# Patient Record
Sex: MALE | Race: WHITE | NOT HISPANIC OR LATINO | Employment: PART TIME | URBAN - METROPOLITAN AREA
[De-identification: names, ages, dates, MRNs, and addresses within clinical notes are randomized per-mention and may not be internally consistent; named-entity substitution may affect disease eponyms.]

---

## 2017-02-16 ENCOUNTER — OFFICE VISIT - RIVER FALLS (OUTPATIENT)
Dept: FAMILY MEDICINE | Facility: CLINIC | Age: 32
End: 2017-02-16

## 2017-02-16 ENCOUNTER — COMMUNICATION - RIVER FALLS (OUTPATIENT)
Dept: FAMILY MEDICINE | Facility: CLINIC | Age: 32
End: 2017-02-16

## 2017-02-17 LAB
CHOLEST SERPL-MCNC: 174 MG/DL (ref 125–200)
CHOLEST/HDLC SERPL: 5.8 {RATIO}
CREAT SERPL-MCNC: 1.01 MG/DL (ref 0.6–1.35)
GLUCOSE BLD-MCNC: 98 MG/DL (ref 65–99)
HDLC SERPL-MCNC: 30 MG/DL
LDLC SERPL CALC-MCNC: 97 MG/DL
NONHDLC SERPL-MCNC: 144 MG/DL
TRIGL SERPL-MCNC: 234 MG/DL

## 2018-05-16 ENCOUNTER — OFFICE VISIT - RIVER FALLS (OUTPATIENT)
Dept: FAMILY MEDICINE | Facility: CLINIC | Age: 33
End: 2018-05-16

## 2018-05-16 ASSESSMENT — MIFFLIN-ST. JEOR: SCORE: 2333.33

## 2018-05-17 LAB
CHOLEST SERPL-MCNC: 164 MG/DL
CHOLEST/HDLC SERPL: 4.4 {RATIO}
CREAT SERPL-MCNC: 0.82 MG/DL (ref 0.6–1.35)
GLUCOSE BLD-MCNC: 98 MG/DL (ref 65–99)
HDLC SERPL-MCNC: 37 MG/DL
LDLC SERPL CALC-MCNC: 99 MG/DL
NONHDLC SERPL-MCNC: 127 MG/DL
TRIGL SERPL-MCNC: 188 MG/DL

## 2019-07-16 ENCOUNTER — OFFICE VISIT - RIVER FALLS (OUTPATIENT)
Dept: FAMILY MEDICINE | Facility: CLINIC | Age: 34
End: 2019-07-16

## 2019-07-16 ENCOUNTER — TRANSFERRED RECORDS (OUTPATIENT)
Dept: MULTI SPECIALTY CLINIC | Facility: CLINIC | Age: 34
End: 2019-07-16

## 2019-07-16 ASSESSMENT — MIFFLIN-ST. JEOR: SCORE: 2335.14

## 2019-07-17 ENCOUNTER — COMMUNICATION - RIVER FALLS (OUTPATIENT)
Dept: FAMILY MEDICINE | Facility: CLINIC | Age: 34
End: 2019-07-17

## 2019-07-17 LAB
BUN SERPL-MCNC: 10 MG/DL (ref 7–25)
BUN/CREAT RATIO - HISTORICAL: ABNORMAL (ref 6–22)
CALCIUM SERPL-MCNC: 9.6 MG/DL (ref 8.6–10.3)
CHLORIDE BLD-SCNC: 104 MMOL/L (ref 98–110)
CO2 SERPL-SCNC: 25 MMOL/L (ref 20–32)
CREAT SERPL-MCNC: 0.97 MG/DL (ref 0.6–1.35)
EGFRCR SERPLBLD CKD-EPI 2021: 102 ML/MIN/1.73M2
ERYTHROCYTE [DISTWIDTH] IN BLOOD BY AUTOMATED COUNT: 13.6 % (ref 11–15)
GLUCOSE BLD-MCNC: 130 MG/DL (ref 65–99)
HCT VFR BLD AUTO: 48.9 % (ref 38.5–50)
HGB BLD-MCNC: 17 GM/DL (ref 13.2–17.1)
MCH RBC QN AUTO: 28.2 PG (ref 27–33)
MCHC RBC AUTO-ENTMCNC: 34.8 GM/DL (ref 32–36)
MCV RBC AUTO: 81.1 FL (ref 80–100)
PLATELET # BLD AUTO: 248 10*3/UL (ref 140–400)
PMV BLD: 10.5 FL (ref 7.5–12.5)
POTASSIUM BLD-SCNC: 4.1 MMOL/L (ref 3.5–5.3)
RBC # BLD AUTO: 6.03 10*6/UL (ref 4.2–5.8)
SODIUM SERPL-SCNC: 137 MMOL/L (ref 135–146)
WBC # BLD AUTO: 5.4 10*3/UL (ref 3.8–10.8)

## 2020-08-12 ENCOUNTER — COMMUNICATION - RIVER FALLS (OUTPATIENT)
Dept: FAMILY MEDICINE | Facility: CLINIC | Age: 35
End: 2020-08-12

## 2020-09-24 ENCOUNTER — OFFICE VISIT - RIVER FALLS (OUTPATIENT)
Dept: FAMILY MEDICINE | Facility: CLINIC | Age: 35
End: 2020-09-24

## 2022-01-10 ENCOUNTER — OFFICE VISIT - RIVER FALLS (OUTPATIENT)
Dept: FAMILY MEDICINE | Facility: CLINIC | Age: 37
End: 2022-01-10

## 2022-02-12 VITALS
DIASTOLIC BLOOD PRESSURE: 70 MMHG | BODY MASS INDEX: 39.2 KG/M2 | WEIGHT: 289 LBS | SYSTOLIC BLOOD PRESSURE: 112 MMHG | HEART RATE: 62 BPM

## 2022-02-12 VITALS — WEIGHT: 301 LBS | BODY MASS INDEX: 40.77 KG/M2 | HEIGHT: 72 IN

## 2022-02-12 VITALS
WEIGHT: 301.4 LBS | HEIGHT: 72 IN | BODY MASS INDEX: 40.82 KG/M2 | SYSTOLIC BLOOD PRESSURE: 122 MMHG | HEART RATE: 64 BPM | DIASTOLIC BLOOD PRESSURE: 70 MMHG

## 2022-02-15 NOTE — TELEPHONE ENCOUNTER
Entered by Latia Magallon CMA on July 12, 2021 1:22:53 PM CDT  ---------------------  From: Latia Magallon CMA   To: [facility]    Sent: 7/12/2021 1:22:53 PM CDT  Subject: Medication Management     ** Not Approved: Patient has requested refill too soon, #90, 3 sent 9/24/20 **  sertraline (SERTRALINE 100MG)  TAKE 1 TABLET BY MOUTH DAILY  Qty:  90 tab(s)        Days Supply:  90        Refills:  2          Substitutions Allowed     Route To Pharmacy - [facility]   Signed by Latai Magallon CMA            ------------------------------------------  From: [employer].  To: Rolando Belcher MD  Sent: July 10, 2021 10:52:25 AM CDT  Subject: Medication Management  Due: June 4, 2021 8:26:15 PM CDT     ** On Hold Pending Signature **     Drug: sertraline (sertraline 100 mg oral tablet), TAKE 1 TABLET BY MOUTH DAILY  Quantity: 90 tab(s)  Days Supply: 90  Refills: 2  Substitutions Allowed  Notes from Pharmacy:     Dispensed Drug: sertraline (sertraline 100 mg oral tablet), TAKE 1 TABLET BY MOUTH DAILY  Quantity: 90 tab(s)  Days Supply: 90  Refills: 2  Substitutions Allowed  Notes from Pharmacy:  ------------------------------------------

## 2022-02-15 NOTE — TELEPHONE ENCOUNTER
Entered by Beth Shepherd CMA on August 12, 2020 10:40:55 AM CDT  ---------------------  From: Beth Shepherd CMA   To: [facility]    Sent: 8/12/2020 10:40:55 AM CDT  Subject: Medication Management     ** Submitted: **  Order:sertraline (sertraline 100 mg oral tablet)  1 tab(s)  Oral  daily  PATIENT DUE FOR PHYSICAL FOR FURTHER REFILLS  Qty:  30 tab(s)        Refills:  0          Substitutions Allowed     Route To Pharmacy - [facility]    Signed by Beth Shepherd CMA  8/12/2020 3:40:00 PM [facility]    ** Submitted: **  Complete:sertraline (sertraline 100 mg oral tablet)   Signed by Beth Shepherd CMA  8/12/2020 3:40:00 PM [facility]    ** Not Approved:  **  sertraline (SERTRALINE 100MG)  1 TAB(S) ORAL DAILY  Qty:  90 tab(s)        Days Supply:  90        Refills:  2          Substitutions Allowed     Route To Pharmacy - [facility]   Signed by Beth Shepherd CMA            ** Patient matched by Beth Shepherd CMA on 8/12/2020 10:39:01 AM CDT **      ------------------------------------------  From: [employer].  To: Rolando Belcher MD  Sent: August 12, 2020 10:14:45 AM CDT  Subject: Medication Management  Due: August 12, 2020 4:17:26 PM CDT     ** On Hold Pending Signature **     Drug: sertraline (sertraline 100 mg oral tablet), 1 TAB(S) ORAL DAILY  Quantity: 90 tab(s)  Days Supply: 90  Refills: 2  Substitutions Allowed  Notes from Pharmacy:     Dispensed Drug: sertraline (sertraline 100 mg oral tablet), 1 TAB(S) ORAL DAILY  Quantity: 90 tab(s)  Days Supply: 90  Refills: 2  Substitutions Allowed  Notes from Pharmacy:  ------------------------------------------Attempted to call patient at 1040- VM box full. Message sent to pharm- pt due for AWV.  30 day supply sent per protocol.

## 2022-02-15 NOTE — LETTER
(Inserted Image. Unable to display)       May 17, 2019      MARY GREENFIELD  [address]  [place], [place] [id number]          Dear MARY,      Thank you for selecting [facility] for your healthcare needs.     Our records indicate you are due for the following services:     Annual Well Adult Exam    To schedule an appointment or if you have further questions, please contact your primary clinic:   [facility]       [phone]   [facility]       [phone]              [facility]     [phone]    Powered by [employer]    Sincerely,    Rolando Belcher MD

## 2022-02-15 NOTE — NURSING NOTE
Medicare Visit Entered On:  7/16/2019 1:08 PM CDT    Performed On:  7/16/2019 1:03 PM CDT by Pam Paz CMA               Summary   Chief Complaint :   AWV   Weight Measured :   301.4 lb(Converted to: 301 lb 6 oz, 136.71 kg)    Height Measured :   72 in(Converted to: 6 ft 0 in, 182.88 cm)    Body Mass Index :   40.87 kg/m2 (HI)    Body Surface Area :   2.63 m2   Systolic Blood Pressure :   122 mmHg   Diastolic Blood Pressure :   70 mmHg   Mean Arterial Pressure :   87 mmHg   Peripheral Pulse Rate :   64 bpm   Pam Paz CMA - 7/16/2019 1:03 PM CDT   Health Status   Allergies Verified? :   Yes   Medication History Verified? :   Yes   Pre-Visit Planning Status :   Completed   Tobacco Use? :   Never smoker   Pam Paz CMA - 7/16/2019 1:03 PM CDT   Consents   Consent for Immunization Exchange :   Consent Granted   Consent for Immunizations to Providers :   Consent Granted   Pam Paz CMA - 7/16/2019 1:03 PM CDT   Meds / Allergies   (As Of: 7/16/2019 1:08:07 PM CDT)   Allergies (Active)   No known allergies  Estimated Onset Date:   Unspecified ; Created By:   [employer] Domain User for [id number]; Reaction Status:   Active ; Substance:   No known allergies      No Known Medication Allergies  Estimated Onset Date:   Unspecified ; Created By:   Pam Paz CMA; Reaction Status:   Active ; Category:   Drug ; Substance:   No Known Medication Allergies ; Type:   Allergy        Medication List   (As Of: 7/16/2019 1:08:07 PM CDT)   Prescription/Discharge Order    sertraline  :   sertraline ; Status:   Prescribed ; Ordered As Mnemonic:   sertraline 100 mg oral tablet ; Simple Display Line:   100 mg, 1 tab(s), po, daily, 90 tab(s), 3 Refill(s) ; Ordering Provider:   Rolando Belcher MD; Catalog Code:   sertraline ; Order Dt/Tm:   5/16/2018 9:29:50 AM            Geriatric Depression Screening   Geriatric Depression Satisfied Life :   Yes   Geriatric Depression Dropped Activities :    No   Geriatric Depression Life Empty :   No   Geriatric Depression Bored :   No   Geriatric Depression Good Spirits :   Yes   Geriatric Depression Afraid Bad Things :   Yes   Geriatric Depression Feel Happy :   Yes   Geriatric Depression Feel Helpless :   Yes   Geriatric Depression Prefer to Stay Home :   Yes   Geriatric Depression Memory Problems :   No   Geriatric Depression Wonderful Be Alive :   Yes   Geriatric Depression Feel Worthless :   No   Geriatric Depression Situation Hopeless :   No   Geriatric Depression Others Better Off :   Yes   Geriatric Depression Full of Energy :   No   Geriatric Depression Total Score :   5    Pam Paz CMA - 7/16/2019 1:03 PM CDT   Home Safety Screen   Emergency Numbers Kept/Updated :   Yes   Aware of Smoking Dangers :   Yes   Smoke Alarms/Fire Extinguisher Available :   Yes   Household Members Fire Safety Knowledge :   Yes   Mats in Bathtub/Shower :   No   Outdoor Clutter Safety :   Yes   Indoor Clutter Safety :   Yes   Electrical Cord Safety :   Yes   Pam Paz CMA - 7/16/2019 1:03 PM CDT   Devlin Fall Risk   History of Fall in Last 3 Months Devlin :   No   Pam Paz CMA - 7/16/2019 1:03 PM CDT   Functional Assessment   Focused Functional Assessment Grid   Bathing :   Independent (2)   Dressing :   Independent (2)   Toileting :   Independent (2)   Transferring Bed or Chair :   Independent (2)   Continence :   Independent (2)   Feeding :   Independent (2)   Pam Paz CMA - 7/16/2019 1:03 PM CDT   ADL Index Score :   12    Capable of Shopping :   Yes   Capable of Walking :   Yes   Capable of Housekeeping :   Yes   Capable of Managing Medications :   Yes   Capable of Handling Finances :   No   Pam Paz CMA - 7/16/2019 1:03 PM CDT

## 2022-02-15 NOTE — LETTER
(Inserted Image. Unable to display)   September 13, 2021    MARY GREENFIELD  [address] [place] [address]            Dear MARY,      Thank you for selecting [facility] for your healthcare needs.    Our records indicate you are due for the following services:     Medicare Annual Wellness Visit / Medication Check.    (FYI   Regarding office visits: In some instances, a video visit or telephone visit may be offered as an option.)      To schedule an appointment or if you have further questions, please contact your clinic at [phone].      Powered by [employer]    Sincerely,    Rolando Belcher MD

## 2022-02-15 NOTE — NURSING NOTE
Comprehensive Intake Entered On:  9/24/2020 9:08 AM CDT    Performed On:  9/24/2020 9:01 AM CDT by Bright Gustafson CMA               Summary   Chief Complaint :   Verbal consent given for a video visit.  Pt is requesting refill on his sertraline.   Bright Gustafson CMA - 9/24/2020 9:01 AM CDT   Health Status   Allergies Verified? :   Yes   Medication History Verified? :   Yes   Medical History Verified? :   Yes   Pre-Visit Planning Status :   Not completed   Tobacco Use? :   Never smoker   Bright Gustafson CMA - 9/24/2020 9:01 AM CDT   Meds / Allergies   (As Of: 9/24/2020 9:08:25 AM CDT)   Allergies (Active)   No known allergies  Estimated Onset Date:   Unspecified ; Created By:   Generated Domain User for [id number]; Reaction Status:   Active ; Substance:   No known allergies ; Updated By:   Generated Domain User for [id number]; Reviewed Date:   9/24/2020 9:02 AM CDT      No Known Medication Allergies  Estimated Onset Date:   Unspecified ; Created By:   Pam Paz CMA; Reaction Status:   Active ; Category:   Drug ; Substance:   No Known Medication Allergies ; Type:   Allergy ; Updated By:   Pam Paz CMA; Reviewed Date:   9/24/2020 9:02 AM CDT        Medication List   (As Of: 9/24/2020 9:08:25 AM CDT)   Prescription/Discharge Order    sertraline  :   sertraline ; Status:   Prescribed ; Ordered As Mnemonic:   sertraline 100 mg oral tablet ; Simple Display Line:   100 mg, 1 tab(s), Oral, daily, PATIENT DUE FOR PHYSICAL FOR FURTHER REFILLS, 30 tab(s), 0 Refill(s) ; Ordering Provider:   Rolando Belcher MD; Catalog Code:   sertraline ; Order Dt/Tm:   8/12/2020 10:40:23 AM CDT            ID Risk Screen   Recent Travel History :   No recent travel   Family Member Travel History :   No recent travel   Other Exposure to Infectious Disease :   Unknown   Bright Gustafson CMA - 9/24/2020 9:01 AM CDT   Procedures / Surgeries        -    Procedure History   (As Of: 9/24/2020 9:08:25 AM CDT)     Anesthesia Minutes:   0 ;  Procedure Name:   Lithotripsy ; Procedure Minutes:   0 ; Last Reviewed Dt/Tm:   9/24/2020 9:03:47 AM CDT            Anesthesia Minutes:   0 ; Procedure Name:   Extraction of wisdom tooth ; Procedure Minutes:   0 ; Last Reviewed Dt/Tm:   9/24/2020 9:04:00 AM CDT            Family History   Family History   (As Of: 9/24/2020 9:08:25 AM CDT)   Negative History     Social History   Social History   (As Of: 9/24/2020 9:08:25 AM CDT)   Alcohol:        Current, Beer (12 oz), 1-2 times per week   (Last Updated: 5/16/2018 9:33:21 AM CDT by Pam Paz CMA)          Tobacco:        Never (less than 100 in lifetime)   (Last Updated: 9/24/2020 9:02:55 AM CDT by Bright Gustafson CMA)

## 2022-02-15 NOTE — LETTER
(Inserted Image. Unable to display)   July 17, 2019        MARY GREENFIELD      [address], [place] [id number]        Dear MARY,    Thank you for choosing [facility] for your healthcare needs. Below you will find the results of your recent test(s) done at our clinic.      Your blood tests are all OK for non-fasting test.      Result Name Current Result Previous Result Reference Range   Sodium Level (mmol/L)  137 7/16/2019  139 5/16/2018 135 - 146   Potassium Level (mmol/L)  4.1 7/16/2019  4.0 5/16/2018 3.5 - 5.3   Chloride Level (mmol/L)  104 7/16/2019  106 5/16/2018 98 - 110   CO2 Level (mmol/L)  25 7/16/2019  25 5/16/2018 20 - 32   Glucose Level (mg/dL) ((H)) 130 7/16/2019  98 5/16/2018 65 - 99   BUN (mg/dL)  10 7/16/2019  11 5/16/2018 7 - 25   Creatinine Level (mg/dL)  0.97 7/16/2019  0.82 5/16/2018 0.60 - 1.35   eGFR (mL/min/1.73m2)  102 7/16/2019  117 5/16/2018 > OR = 60 -    eGFR  (mL/min/1.73m2)  118 7/16/2019  136 5/16/2018 > OR = 60 -    Calcium Level (mg/dL)  9.6 7/16/2019  9.2 5/16/2018 8.6 - 10.3   WBC  5.4 7/16/2019  3.8 - 10.8   Hgb (gm/dL)  17.0 7/16/2019  13.2 - 17.1   Hct (%)  48.9 7/16/2019  38.5 - 50.0   MCHC (gm/dL)  34.8 7/16/2019  32.0 - 36.0   RDW (%)  13.6 7/16/2019  11.0 - 15.0   Platelet  248 7/16/2019  140 - 400   MPV (fL)  10.5 7/16/2019  7.5 - 12.5       Please contact me or my assistant at [phone] if you have any questions or concerns.     Sincerely,        Rolando Belcher MD        What do your labs mean?  Below is a glossary of commonly ordered labs:  LDL   Bad Cholesterol   HDL   Good Cholesterol  AST/ALT   Liver Function   Cr/Creatinine   Kidney Function  Microalbumin   Kidney Function  BUN   Kidney Function  PSA   Prostate    TSH   Thyroid Hormone  HgbA1c   Diabetes Test   Hgb (Hemoglobin)   Red Blood Cells

## 2022-02-15 NOTE — TELEPHONE ENCOUNTER
Entered by Amparo Falcon MA on October 18, 2021 9:42:09 AM CDT  ---------------------  From: Amparo Falcon MA   To: [facility]    Sent: 10/18/2021 9:42:09 AM CDT  Subject: Medication Management     ** Not Approved: Refill not appropriate, duplicate entry **  sertraline (SERTRALINE 100MG)  TAKE 1 TABLET BY MOUTH DAILY  Qty:  90 tab(s)        Days Supply:  90        Refills:  0          Substitutions Allowed     Route To Pharmacy - [facility]   Signed by Amparo Falcon MA            ** Submitted: **  Order:sertraline (sertraline 100 mg oral tablet)  1 tab(s)  Oral  daily  Qty:  30 tab(s)        Refills:  0          Substitutions Allowed     Route To Pharmacy - [facility]    Signed by Amparo Falcon MA  10/18/2021 2:41:00 PM [facility] [[email or web address]    ** Submitted: **  Complete:sertraline (sertraline 100 mg oral tablet)   Signed by Amparo Falcon MA  10/18/2021 2:41:00 PM [facility] [[email or web address]]    ** Not Approved:  **  sertraline (SERTRALINE 100MG)  TAKE 1 TABLET BY MOUTH DAILY  Qty:  90 tab(s)        Days Supply:  90        Refills:  0          Substitutions Allowed     Route To Pharmacy - [facility]   Signed by Amparo Falcon MA            ** Patient matched by Amparo Falcon MA on 10/18/2021 9:40:01 AM CDT **      ------------------------------------------  From: [employer].  To: Rolando Belcher MD  Sent: October 12, 2021 11:06:37 AM CDT  Subject: Medication Management  Due: October 2, 2021 3:22:27 PM CDT     ** On Hold Pending Signature **     Drug: sertraline (sertraline 100 mg oral tablet), TAKE 1 TABLET BY MOUTH DAILY  Quantity: 90 tab(s)  Days Supply: 90  Refills: 0  Substitutions Allowed  Notes from Pharmacy:     Dispensed Drug: sertraline (sertraline 100 mg oral tablet), TAKE 1 TABLET BY MOUTH DAILY  Quantity: 90 tab(s)  Days Supply: 90  Refills: 0  Substitutions Allowed  Notes from Pharmacy:     ** On Hold Pending Signature **     Drug: sertraline (sertraline 100 mg oral tablet), TAKE 1 TABLET BY MOUTH  DAILY  Quantity: 90 tab(s)  Days Supply: 90  Refills: 0  Substitutions Allowed  Notes from Pharmacy:     Dispensed Drug: sertraline (sertraline 100 mg oral tablet), TAKE 1 TABLET BY MOUTH DAILY  Quantity: 90 tab(s)  Days Supply: 90  Refills: 0  Substitutions Allowed  Notes from Pharmacy:  ------------------------------------------LV:  9/24/2020 peggy/ [facility] for anxiety f/u.  RTC now due, reminder letters sent, message sent to pharmacy

## 2022-02-15 NOTE — PROGRESS NOTES
Patient:   MARY GREENFIELD            MRN: [id number]            FIN: [id number]               Age:   32 years     Sex:  Male     :  1985   Associated Diagnoses:   Asperger syndrome; Anxiety; Lipid screening; Diabetes mellitus screening   Author:   Rolando Belcher MD      Chief Complaint   2017 8:28 AM CST    Med check      History of Present Illness   see chief complaint as noted above and confirmed with the patient   32 year old male presenting for a medication check up. 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  He's not getting out as often as he use to. He doesn't do any regular exercise. He is currently taking sertaline and is doing well on it.When he gets anxious he does get angry.   has been diagnosed with asperger syndrome.  lviing with mom and happy       Review of Systems   Constitutional:  No decreased activity.    Respiratory:  No cough.    Gastrointestinal:  No nausea, No vomiting.    Integumentary:  No rash.    Neurologic:  No headache.    Psychiatric:  Anxiety.             Health Status   Allergies:    Allergic Reactions (Selected)  No Known Medication Allergies   Medications:  (Selected)   Documented Medications  Documented  sertraline 100 mg oral tablet: 1 tab(s) ( 100 mg ), po, daily, 0 Refill(s), Type: Maintenance   Problem list:    No problem items selected or recorded.      Histories   Past Medical History:    No active or resolved past medical history items have been selected or recorded.   Family History:    No family history items have been selected or recorded.   Procedure history:    No active procedure history items have been selected or recorded.   Social History:             No active social history items have been recorded.      Physical Examination   Vital Signs   2017 8:28 AM CST Peripheral Pulse Rate 62 bpm    Systolic Blood Pressure 112 mmHg    Diastolic Blood Pressure 70 mmHg    Mean Arterial Pressure 84 mmHg      Measurements from flowsheet :  Measurements   2/16/2017 8:28 AM CST    Weight Measured - Standard                289 lb     General:  Alert and oriented, No acute distress.    Eye:  Pupils are equal, round and reactive to light, Normal conjunctiva.    HENT:  Oral mucosa is moist.    Neck:  Supple.    Respiratory:  Respirations are non-labored.    Cardiovascular:  Normal rate, Regular rhythm, No edema.    Gastrointestinal:  Non-distended.    Musculoskeletal:  Normal gait.    Integumentary:  Warm, No rash.    Psychiatric:  Cooperative, Appropriate mood & affect, Normal judgment.       Review / Management   Results review      Impression and Plan   Diagnosis     Asperger syndrome (JNJ60-YE F84.5).     Anxiety (RXA65-KX F41.9).     Lipid screening (ERY87-FE Z13.220).     Diabetes mellitus screening (EXC62-MQ Z13.1).     Plan:  Will send him to lab for lipid panel and chem 8. Sertaline was refilled for 1 year. Will follow up in 1 year.     has limitations but is in safe environment with job he enjoys.  no other issues.  Reviewed expected course, what to watch for, and when to return.   I, Cristine Paz [facility], acted solely as a scribe for, and in the presence of Dr. Rolando Belcher who performed the service..

## 2022-02-15 NOTE — PROGRESS NOTES
Patient:   MARY GREENFIELD            MRN: [id number]            FIN: [id number]               Age:   33 years     Sex:  Male     :  1985   Associated Diagnoses:   Medicare annual wellness visit, initial; Asperger syndrome; Obesity   Author:   Rolando Belcher MD      Visit Information      Care Providers  Not recorded for selected visit.  Ancillary Services  Not recorded for selected visit.          Current Visit Date:  2018          Chief Complaint   2018 9:03 AM CDT    AWV      History of Present Illness             The patient presents for Medicare annual wellness exam.  The patient's general health status is described as unchanged and stable.  The patient's diet is described as balanced.  Exercise occasional.  Associated symptoms consist of denies shortness of breath and denies weight loss.     He plays board games with his brother every Monday. He really enjoys this. He currently works 3 days a week at the [employer]. He doesn't do any regular physical activity. He has been trying to watch his diet because he started gaining weight when he started living on his own..      Review of Systems   Constitutional:  No fever, No chills.    Ear/Nose/Mouth/Throat:  Negative.    See completed Health History for Review of Systems   Respiratory:  No shortness of breath.    Cardiovascular:  No chest pain.    Gastrointestinal:  No nausea, No vomiting.    Musculoskeletal:  No back pain.    Integumentary:  No rash.    Psychiatric:  GDS noted.       Health Status   Allergies:    Allergic Reactions (Selected)  Severity Not Documented  No known allergies (No reactions were documented)  No Known Medication Allergies   Medications:  (Selected)   Prescriptions  Prescribed  sertraline 100 mg oral tablet: 1 tab(s) ( 100 mg ), po, daily, # 90 tab(s), 3 Refill(s), Type: Maintenance, Pharmacy: [facility], 1 tab(s) po daily,x90 day(s)   Problem list:    All Problems  Other Specified Pervasive Developmental Disorders,  Current or Active State / ICD-9-.80 / Confirmed  Obesity, Unspecified / ICD-9-.00 / Probable  Obesity / SNOMED CT 2752197101 / Probable  Asperger syndrome / SNOMED CT 28923070 / Confirmed   Medicare Assessments      Fall Risk Screen  Not recorded for selected visit.     Functional Assessment  Not recorded for selected visit.       Geriatric Depression Screen  Not recorded for selected visit.     Hearing Screen  Not recorded for selected visit.     Home Safety Screen  Not recorded for selected visit.     Vision Screen  Not recorded for selected visit.     ADL's and Safety reviewed with patient.      Histories   Past Medical History:    Resolved  Kidney stones ([id number]): Onset in 2014 at 29 years.  Resolved.  Kidney stone:  Resolved.  Hydronephrosis:  Resolved.   Family History:    No family history items have been selected or recorded.   Procedure history:    No active procedure history items have been selected or recorded.   Social History:        Alcohol Assessment            1-2 times per week        Physical Examination   Measurements from flowsheet : Measurements   5/16/2018 9:03 AM CDT Height Measured - Standard 72 in    Weight Measured - Standard 301.0 lb    BSA 2.63 m2    Body Mass Index 40.82 kg/m2  HI      General:  Alert and oriented, No acute distress.    Eye:  Pupils are equal, round and reactive to light, Normal conjunctiva.    HENT:  Tympanic membranes are clear, Oral mucosa is moist.    Neck:  Supple, Non-tender, No carotid bruit, No lymphadenopathy.    Respiratory:  Respirations are non-labored.    Cardiovascular:  Normal rate, Regular rhythm, No edema.    Gastrointestinal:  Soft, Non-tender, Non-distended.    Musculoskeletal:  Normal range of motion, Normal gait.    Integumentary:  Warm, No rash.    Neurologic:  Alert, Oriented, No focal deficits.    Cognition and Speech:  Oriented, Speech clear and coherent, Functional cognition intact.    Psychiatric:   Cooperative, Appropriate mood & affect, Normal judgment, Patient's GDS and CAGE questionnaire reviewed and discussed with patient. .       Impression and Plan   Course:  Progressing as expected.    Plan:  Discussed  preventative services.  Appropriate weight and diet discussed.  Discussed Advance Life Directives.    Preventative services needed::  Preventative services checklist reviewed, updated and copy given to patient.  Please see scanned document.         HIV risk screening: No.    Patient Instructions:          Limitations: Limit caffeine intake, Limit alcohol consumption.         Counseled: Patient, Regarding treatment, Regarding medications, Diet, Activity, Verbalized understanding.    Diagnosis     Medicare annual wellness visit, initial (RBM33-UR Z00.00).     Asperger syndrome (VJJ43-HA F84.5).     Obesity (LPJ73-MQ E66.9).     Plan:  Will Sent him to lab for chem 8, vit d and lipid panel. Discussed doing some regular exercise. Will refill sertaline for 1 year.    Due next visit in 1 year.     [facility], acted solely as a scribe for, and in the presence of Dr. Rolando Belcher who performed the service..    Total time spent with patient and coordination of care:  30  minutes

## 2022-02-15 NOTE — PROGRESS NOTES
Chief Complaint    Verbal consent given for a video visit.  Pt is requesting refill on his sertraline.  History of Present Illness      35-year-old with video visit from his home.  Visit occurred from 9:20 to 9:40 AM       Patient is requesting a refill of his sertraline patient has been taking it regularly over the past year and says it helps with just general feelings of worry and anxiety.  Patient has Asperger's syndrome he is though living alone now does some of his own cooking.  He had been working at [employer] but was laid off due to the coronavirus but hopes that he will get called back to work soon plays a lot of video games and he likes to go out for walks he says he is happy with his present status  Review of Systems      See HPI.  All other review of systems negative.  Physical Exam      Alert talkative normal cognition  Assessment/Plan       Anxiety (F41.9)         Refilled his sertraline talked about having structure in his day and finding meaningful work         Ordered:          88610 office outpatient visit 15 minutes (Charge), Quantity: 1, Anxiety           Patient Information     Name:MARY GREENFIELD LUIGI      Address:      [address] [place] [id number]     Sex:Male     YOB: 1985     [phone]     Emergency Contact:MARY GREENFIELD     MRN:[id number]     FIN:[id number]     Location:[facility]     Date of Service:09/24/2020      Primary Care Physician:       Rolando Belcher MD, [phone]      Attending Physician:       Rolando Belcher MD, [phone]  Problem List/Past Medical History    Ongoing     Asperger syndrome     Obesity     Other Specified Pervasive Developmental Disorders, Current or Active State       Comments: dx as a child    Historical     H/O: chickenpox     Hydronephrosis     Kidney stone     Kidney stones  Procedure/Surgical History     Extraction of wisdom tooth     Lithotripsy  Medications    sertraline 100  mg oral tablet, 100 mg= 1 tab(s), Oral, daily, 3 refills  Allergies    No Known Medication Allergies    No known allergies  Social History    Smoking Status     Never smoker     Alcohol      Current, Beer (12 oz), 1-2 times per week     Tobacco      Never (less than 100 in lifetime)  Family History    Family history is negative  Immunizations      Vaccine Date Status          tetanus/diphth/pertuss (Tdap) adult/adol 09/17/2015 Recorded          tetanus/diphth/pertuss (Tdap) adult/adol 10/23/2014 Given          meningococcal conjugate vaccine 08/05/2005 Recorded          Td 08/05/2005 Recorded          DTaP 07/25/1990 Recorded          MMR (measles/mumps/rubella) 07/25/1990 Recorded          DTaP 09/02/1987 Recorded          MMR (measles/mumps/rubella) 07/09/1987 Recorded          DTaP 1986 Recorded          DTaP 1986 Recorded          DTaP 1986 Recorded

## 2022-02-15 NOTE — PROGRESS NOTES
Patient:   MARY GREENFIELD            MRN: [id number]            FIN: [id number]               Age:   34 years     Sex:  Male     :  1985   Associated Diagnoses:   Medicare annual wellness visit, initial; Asperger syndrome; Obesity   Author:   Rolando Belcher MD      Visit Information      Care Providers  Not recorded for selected visit.  Ancillary Services  Not recorded for selected visit.        Current Visit Date:  2019  Initial AWV Date:  2018        Chief Complaint   2019 1:03 PM CDT    AWV        History of Present Illness             The patient presents for Medicare annual wellness exam.  The patient's general health status is described as unchanged and stable.  The patient's diet is described as balanced.  Exercise occasional.  Associated symptoms consist of denies shortness of breath and denies weight loss.     He plays board games with his brother every Monday. He really enjoys this. He currently works 3 days a week at the [employer] and has been for several years.. He doesn't do any regular physical activity but he has started walking to work occasional (1 mile each way) He has been trying to watch his diet because he started gaining weight when he started living on his own..      Review of Systems   Constitutional:  No fever, No chills.    Ear/Nose/Mouth/Throat:  Negative.    See completed Health History for Review of Systems   Respiratory:  No shortness of breath.    Cardiovascular:  No chest pain.    Gastrointestinal:  No nausea, No vomiting.    Musculoskeletal:  No back pain.    Integumentary:  No rash.    Psychiatric:  GDS noted.       Health Status   Allergies:    Allergic Reactions (Selected)  Severity Not Documented  No known allergies (No reactions were documented)  No Known Medication Allergies   Medications:  (Selected)   Prescriptions  Prescribed  sertraline 100 mg oral tablet: = 1 tab(s) ( 100 mg ), po, daily, # 90 tab(s), 3 Refill(s), Type: Maintenance, Pharmacy:  [facility], 1 tab(s) Oral daily,    Medications          *denotes recorded medication          sertraline 100 mg oral tablet: 100 mg, 1 tab(s), po, daily, 90 tab(s), 3 Refill(s).     Problem list:    All Problems  Other Specified Pervasive Developmental Disorders, Current or Active State / 299.80 / Confirmed  dx as a child  Obesity / 4652480601 / Probable  Asperger syndrome / 43106217 / Confirmed   Medicare Assessments      Devlin Fall Risk  (07/16/2019 13:03 pm)       History of Fall in Last 3 Months Devlin:  No   Functional Assessment  (07/16/2019 13:03 pm)       Bathing ADL Index:  Independent (2)       Dressing ADL Index:  Independent (2)       Toileting ADL Index:  Independent (2)       Transferring Bed or Chair ADL Index:  Independent (2)       Continence ADL Index:  Independent (2)       Feeding ADL Index:  Independent (2)       ADL Index Score:  12   Depression Screening  Not recorded for selected visit.    Detailed Depression Screening  Not recorded for selected visit.    Geriatric Depression Screen  (07/16/2019 13:03 pm)       Geriatric Depression Satisfied Life:  Yes       Geriatric Depression Dropped Activities:  No       Geriatric Depression Life Empty:  No       Geriatric Depression Bored:  No       Geriatric Depression Good Spirits:  Yes       Geriatric Depression Afraid Bad Things:  Yes       Geriatric Depression Feel Happy:  Yes       Geriatric Depression Feel Helpless:  Yes       Geriatric Depression Prefer to Stay Home:  Yes       Geriatric Depression Memory Problems:  No       Geriatric Depression Wonderful Be Alive:  Yes       Geriatric Depression Feel Worthless:  No       Geriatric Depression Situation Hopeless:  No       Geriatric Depression Others Better Off:  Yes       Geriatric Depression Full of Energy:  No       Geriatric Depression Total Score:  5   Hearing Screen  Not recorded for selected visit.   Home Safety Screen  (07/16/2019 13:03 pm)       Emergency Numbers Kept/Updated:  Yes        Aware of Smoking Dangers:  Yes       Smoke Alarms/Fire Extinguisher Available:  Yes       Household Members Fire Safety Knowledge:  Yes       Mats in Bathtub/Shower:  No       Outdoor Clutter Safety:  Yes       Indoor Clutter Safety:  Yes       Electrical Cord Safety:  Yes   Vision Screen  Not recorded for selected visit.   ADL's and Safety reviewed with patient.      Histories   Past Medical History:    Resolved  Kidney stones ([id number]): Onset in 2014 at 29 years.  Resolved.  Kidney stone:  Resolved.  Hydronephrosis:  Resolved.   Family History:    No family history items have been selected or recorded.   Procedure history:    No active procedure history items have been selected or recorded.   Social History:        Alcohol Assessment            Current, Beer (12 oz), 1-2 times per week      Physical Examination   Vital Signs   7/16/2019 1:03 PM CDT Peripheral Pulse Rate 64 bpm    Systolic Blood Pressure 122 mmHg    Diastolic Blood Pressure 70 mmHg    Mean Arterial Pressure 87 mmHg      Measurements from flowsheet : Measurements   7/16/2019 1:03 PM CDT Height Measured - Standard 72 in    Weight Measured - Standard 301.4 lb    BSA 2.63 m2    Body Mass Index 40.87 kg/m2  HI      General:  Alert and oriented, No acute distress.    Eye:  Pupils are equal, round and reactive to light, Normal conjunctiva.    HENT:  Tympanic membranes are clear, Oral mucosa is moist.    Neck:  Supple, Non-tender, No carotid bruit, No lymphadenopathy.    Respiratory:  Lungs are clear to auscultation, Respirations are non-labored.    Cardiovascular:  Normal rate, Regular rhythm, No edema.    Gastrointestinal:  Soft, Non-tender, Non-distended.    Musculoskeletal:  Normal range of motion, Normal gait.    Integumentary:  Warm, No rash.    Neurologic:  Alert, Oriented, No focal deficits.    Cognition and Speech:  Oriented, Speech clear and coherent, Functional cognition intact.    Psychiatric:  Cooperative,  Appropriate mood & affect, Normal judgment, Patient's GDS and CAGE questionnaire reviewed and discussed with patient. .       Impression and Plan   Course:  Progressing as expected.    Plan:  Discussed  preventative services.  Appropriate weight and diet discussed.  Discussed Advance Life Directives.    Preventative services needed::  Preventative services checklist reviewed, updated and copy given to patient.  Please see scanned document.         HIV risk screening: No.    Patient Instructions:          Limitations: Limit caffeine intake, Limit alcohol consumption.         Counseled: Patient, Regarding treatment, Regarding medications, Diet, Activity, Verbalized understanding.    Diagnosis     Medicare annual wellness visit, initial (YWX96-OK Z00.00).     Asperger syndrome (BPK20-PR F84.5).     Obesity (OCC89-FL E66.9).     Plan:  Will Sent him to lab for chem 8, vit d and lipid panel. Discussed doing some regular exercise. Will refill sertaline for 1 year.  encouraged to continue increased walking  Due next visit in 1 year.     .    Total time spent with patient and coordination of care:  30  minutes

## 2022-02-15 NOTE — NURSING NOTE
Depression Screening Entered On:  9/24/2020 9:08 AM CDT    Performed On:  9/24/2020 9:08 AM CDT by Bright Gustafson CMA               Depression Screening   Little Interest - Pleasure in Activities :   Several days   Feeling Down, Depressed, Hopeless :   Several days   Initial Depression Screen Score :   2 Score   Poor Appetite or Overeating :   More than half the days   Trouble Falling or Staying Asleep :   More than half the days   Feeling Tired or Little Energy :   More than half the days   Feeling Bad About Yourself :   Several days   Trouble Concentrating :   Not at all   Moving or Speaking Slowly :   Not at all   Thoughts Better Off Dead or Hurting Self :   Not at all   ROBI Difficulty with Work, Home, Others :   Not difficult at all   Detailed Depression Screen Score :   7    Total Depression Screen Score :   9    Bright Gustafson CMA - 9/24/2020 9:08 AM CDT

## 2022-03-02 VITALS
DIASTOLIC BLOOD PRESSURE: 88 MMHG | TEMPERATURE: 97.5 F | HEART RATE: 61 BPM | BODY MASS INDEX: 36.31 KG/M2 | WEIGHT: 268.1 LBS | SYSTOLIC BLOOD PRESSURE: 146 MMHG | HEIGHT: 72 IN

## 2022-03-02 NOTE — NURSING NOTE
CAGE Assessment Entered On:  1/10/2022 6:12 PM CST    Performed On:  1/10/2022 6:12 PM CST by Amparo Falcon MA               Assessment   Have you ever felt you should cut down on your drinking :   No   Have people annoyed you by criticizing your drinking :   No   Have you ever felt bad or guilty about your drinking :   No   Have you ever taken a drink first thing in the morning to steady your nerves or get rid of a hangover (Eye-opener) :   No   CAGE Score :   0    Amparo Falcon MA - 1/10/2022 6:12 PM CST

## 2022-03-02 NOTE — NURSING NOTE
Depression Screening Entered On:  1/10/2022 6:12 PM CST    Performed On:  1/10/2022 6:12 PM CST by Terrie BAUTISTA, Amparo               Depression Screening   Little Interest - Pleasure in Activities :   Several days   Feeling Down, Depressed, Hopeless :   Several days   Initial Depression Screen Score :   2 Score   Poor Appetite or Overeating :   Not at all   Trouble Falling or Staying Asleep :   More than half the days   Feeling Tired or Little Energy :   More than half the days   Feeling Bad About Yourself :   Several days   Trouble Concentrating :   Not at all   Moving or Speaking Slowly :   Not at all   Thoughts Better Off Dead or Hurting Self :   Not at all   Detailed Depression Screen Score :   5    Total Depression Screen Score :   7    Amparo Falcon MA - 1/10/2022 6:12 PM CST

## 2022-03-02 NOTE — PROGRESS NOTES
Patient:   MARY GREENFIELD            MRN: [id number]            FIN: [id number]               Age:   36 years     Sex:  Male     :  1985   Associated Diagnoses:   Asperger syndrome; Anxiety; Lipid screening; Diabetes mellitus screening   Author:   Rolando Belcher MD      Chief Complaint      History of Present Illness   see chief complaint as noted above and confirmed with the patient   36 year old male presenting for a medication check up. He currently works at the [email or web address] and enjoys it.  He's not getting out as often as he use to. He doesn't do any regular exercise. He is currently taking sertraline and is doing well on it.  When he gets anxious he does get angry.   has been diagnosed with Asperger's syndrome.  living with mom and happy .  his brother moved farther away so no longer see him as much.   feels happy with life      Review of Systems   Constitutional:  No decreased activity.    Respiratory:  No cough.    Gastrointestinal:  No nausea, No vomiting.    Integumentary:  No rash.    Neurologic:  No headache.    Psychiatric:  Anxiety.              Health Status   Allergies:    Allergic Reactions (Selected)  Severity Not Documented  No known allergies (No reactions were documented)  No Known Medication Allergies   Medications:  (Selected)   Prescriptions  Prescribed  sertraline 100 mg oral tablet: = 1 tab(s) ( 100 mg ), Oral, daily, # 90 tab(s), 3 Refill(s), Type: Maintenance, Pharmacy: [facility], No further refills until pt is seen--overdue for annual check up., 1 tab(s) Oral daily,x90 day(s), 72, in, 01/10/22 17:45:00 CST, Height Measure...,    Medications          *denotes recorded medication          sertraline 100 mg oral tablet: 100 mg, 1 tab(s), Oral, daily, for 90 day(s), 90 tab(s), 3 Refill(s).       Problem list:    All Problems  Obesity / [id number] / Probable  Asperger syndrome / 43654818 / Confirmed  Other Specified Pervasive Developmental Disorders, Current or Active State /  299.80 / Confirmed  dx as a child  Resolved: Kidney stone  Resolved: Hydronephrosis  Resolved: H/O: chickenpox / [id number]  Resolved: Kidney stones / [id number]  Canceled: Obesity / 278.00      Histories   Past Medical History:    Resolved  Kidney stones ([id number]): Onset in 2014 at 29 years.  Resolved.  Kidney stone:  Resolved.  Hydronephrosis:  Resolved.  H/O: chickenpox ([id number]):  Resolved.   Family History:    Entire family history is negative.   Procedure history:    Lithotripsy (SNOMED CT 524588071).  Extraction of wisdom tooth (SNOMED CT 826752134).   Social History:        Electronic Cigarette/Vaping Assessment            Electronic Cigarette Use: Never.      Alcohol Assessment            Current, Beer (12 oz), 1-2 times per week      Tobacco Assessment            Never (less than 100 in lifetime)        Physical Examination   Vital Signs   1/10/2022 5:45 PM CST Temperature Tympanic 97.5 DegF  LOW    Peripheral Pulse Rate 61 bpm    Pulse Site Brachial artery    HR Method Electronic    Systolic Blood Pressure 146 mmHg  HI    Diastolic Blood Pressure 88 mmHg  HI    Mean Arterial Pressure 107 mmHg    BP Site Right arm    BP Method Electronic      Measurements from flowsheet : Measurements   1/10/2022 5:45 PM CST Height Measured 72 in    Weight Measured 268.1 lb    BSA 2.48 m2    Body Mass Index 36.36 kg/m2  HI      General:  Alert and oriented, No acute distress.    Eye:  Pupils are equal, round and reactive to light, Normal conjunctiva.    HENT:  Oral mucosa is moist.    Neck:  Supple.    Respiratory:  Respirations are non-labored.    Cardiovascular:  Normal rate, Regular rhythm, No edema.    Gastrointestinal:  Non-distended.    Musculoskeletal:  Normal gait.    Integumentary:  Warm, No rash.    Psychiatric:  Cooperative, Appropriate mood & affect, Normal judgment.       Review / Management   Results review      Impression and Plan   Diagnosis      Asperger syndrome (FJS45-MY F84.5).     Anxiety (JXD23-NS F41.9).     Lipid screening (MLR55-YI Z13.220).     Diabetes mellitus screening (ZOK08-XK Z13.1).     Plan:      has limitations but is in safe environment with job he enjoys.  no other issues.  Reviewed expected course, what to watch for, and when to return.   .    Orders     Orders (Selected)   Prescriptions  Prescribed  sertraline 100 mg oral tablet: = 1 tab(s) ( 100 mg ), Oral, daily, # 90 tab(s), 3 Refill(s), Type: Maintenance, Pharmacy: [facility], No further refills until pt is seen--overdue for annual check up., 1 tab(s) Oral daily,x90 day(s), 72, in, 01/10/22 17:45:00 CST, Height Measure....

## 2022-03-02 NOTE — NURSING NOTE
Comprehensive Intake Entered On:  1/10/2022 5:50 PM CST    Performed On:  1/10/2022 5:45 PM CST by Terrie BAUTISTA, Amparo               Summary   Chief Complaint :   depression f/u, refill sertraline   Weight Measured :   268.1 lb(Converted to: 268 lb 2 oz, 121.608 kg)    Height Measured :   72 in(Converted to: 6 ft 0 in, 182.88 cm)    Body Mass Index :   36.36 kg/m2 (HI)    Body Surface Area :   2.48 m2   Systolic Blood Pressure :   146 mmHg (HI)    Diastolic Blood Pressure :   88 mmHg (HI)    Mean Arterial Pressure :   107 mmHg   Peripheral Pulse Rate :   61 bpm   BP Site :   Right arm   Pulse Site :   Brachial artery   BP Method :   Electronic   HR Method :   Electronic   Temperature Tympanic :   97.5 DegF(Converted to: 36.4 DegC)  (LOW)    Amparo Falcon MA - 1/10/2022 5:45 PM CST   Health Status   Allergies Verified? :   Yes   Medication History Verified? :   Yes   Medical History Verified? :   Yes   Pre-Visit Planning Status :   Completed   Tobacco Use? :   Never smoker   Amparo Falcon MA - 1/10/2022 5:45 PM CST   Consents   Consent for Immunization Exchange :   Consent Granted   Consent for Immunizations to Providers :   Consent Granted   Amparo Falcon MA - 1/10/2022 5:45 PM CST   Meds / Allergies   (As Of: 1/10/2022 5:50:11 PM CST)   Allergies (Active)   No known allergies  Estimated Onset Date:   Unspecified ; Created By:   Generated Domain User for [id number]; Reaction Status:   Active ; Substance:   No known allergies ; Updated By:   Generated Domain User for [id number]; Reviewed Date:   9/24/2020 9:02 AM CDT      No Known Medication Allergies  Estimated Onset Date:   Unspecified ; Created By:   Pam Paz CMA; Reaction Status:   Active ; Category:   Drug ; Substance:   No Known Medication Allergies ; Type:   Allergy ; Updated By:   Pam Paz CMA; Reviewed Date:   9/24/2020 9:02 AM CDT        Medication List   (As Of: 1/10/2022 5:50:11 PM CST)   Prescription/Discharge Order    sertraline  :    sertraline ; Status:   Prescribed ; Ordered As Mnemonic:   sertraline 100 mg oral tablet ; Simple Display Line:   100 mg, 1 tab(s), Oral, daily, 30 tab(s), 0 Refill(s) ; Ordering Provider:   Rolando Belcher MD; Catalog Code:   sertraline ; Order Dt/Tm:   10/18/2021 9:41:21 AM CDT            Social History   Social History   (As Of: 1/10/2022 5:50:11 PM CST)   Alcohol:        Current, Beer (12 oz), 1-2 times per week   (Last Updated: 5/16/2018 9:33:21 AM CDT by Pam Paz CMA)          Tobacco:        Never (less than 100 in lifetime)   (Last Updated: 1/10/2022 5:46:14 PM CST by Amparo Falcon MA)          Electronic Cigarette/Vaping:        Electronic Cigarette Use: Never.   (Last Updated: 1/10/2022 5:46:19 PM CST by Amparo Falcon MA)

## 2023-02-28 ENCOUNTER — OFFICE VISIT (OUTPATIENT)
Dept: FAMILY MEDICINE | Facility: CLINIC | Age: 38
End: 2023-02-28

## 2023-02-28 VITALS
RESPIRATION RATE: 16 BRPM | WEIGHT: 275 LBS | TEMPERATURE: 96.9 F | HEIGHT: 73 IN | DIASTOLIC BLOOD PRESSURE: 92 MMHG | HEART RATE: 62 BPM | OXYGEN SATURATION: 95 % | SYSTOLIC BLOOD PRESSURE: 136 MMHG | BODY MASS INDEX: 36.45 KG/M2

## 2023-02-28 DIAGNOSIS — F33.1 MODERATE RECURRENT MAJOR DEPRESSION (H): Primary | ICD-10-CM

## 2023-02-28 PROBLEM — E66.9 OBESITY: Status: ACTIVE | Noted: 2023-02-28

## 2023-02-28 PROBLEM — F84.5 ASPERGER'S DISORDER: Status: ACTIVE | Noted: 2023-02-28

## 2023-02-28 PROCEDURE — 96127 BRIEF EMOTIONAL/BEHAV ASSMT: CPT | Performed by: PHYSICIAN ASSISTANT

## 2023-02-28 PROCEDURE — 99213 OFFICE O/P EST LOW 20 MIN: CPT | Performed by: PHYSICIAN ASSISTANT

## 2023-02-28 RX ORDER — SERTRALINE HYDROCHLORIDE 100 MG/1
100 TABLET, FILM COATED ORAL DAILY
Qty: 90 TABLET | Refills: 1 | Status: SHIPPED | OUTPATIENT
Start: 2023-02-28 | End: 2023-08-16

## 2023-02-28 RX ORDER — SERTRALINE HYDROCHLORIDE 100 MG/1
100 TABLET, FILM COATED ORAL
COMMUNITY
Start: 2021-10-18 | End: 2023-02-28

## 2023-02-28 ASSESSMENT — ANXIETY QUESTIONNAIRES
GAD7 TOTAL SCORE: 17
3. WORRYING TOO MUCH ABOUT DIFFERENT THINGS: NEARLY EVERY DAY
7. FEELING AFRAID AS IF SOMETHING AWFUL MIGHT HAPPEN: MORE THAN HALF THE DAYS
5. BEING SO RESTLESS THAT IT IS HARD TO SIT STILL: SEVERAL DAYS
8. IF YOU CHECKED OFF ANY PROBLEMS, HOW DIFFICULT HAVE THESE MADE IT FOR YOU TO DO YOUR WORK, TAKE CARE OF THINGS AT HOME, OR GET ALONG WITH OTHER PEOPLE?: EXTREMELY DIFFICULT
GAD7 TOTAL SCORE: 17
IF YOU CHECKED OFF ANY PROBLEMS ON THIS QUESTIONNAIRE, HOW DIFFICULT HAVE THESE PROBLEMS MADE IT FOR YOU TO DO YOUR WORK, TAKE CARE OF THINGS AT HOME, OR GET ALONG WITH OTHER PEOPLE: EXTREMELY DIFFICULT
6. BECOMING EASILY ANNOYED OR IRRITABLE: NEARLY EVERY DAY
2. NOT BEING ABLE TO STOP OR CONTROL WORRYING: NEARLY EVERY DAY
1. FEELING NERVOUS, ANXIOUS, OR ON EDGE: MORE THAN HALF THE DAYS
GAD7 TOTAL SCORE: 17
4. TROUBLE RELAXING: NEARLY EVERY DAY
7. FEELING AFRAID AS IF SOMETHING AWFUL MIGHT HAPPEN: MORE THAN HALF THE DAYS

## 2023-02-28 ASSESSMENT — PATIENT HEALTH QUESTIONNAIRE - PHQ9
10. IF YOU CHECKED OFF ANY PROBLEMS, HOW DIFFICULT HAVE THESE PROBLEMS MADE IT FOR YOU TO DO YOUR WORK, TAKE CARE OF THINGS AT HOME, OR GET ALONG WITH OTHER PEOPLE: EXTREMELY DIFFICULT
SUM OF ALL RESPONSES TO PHQ QUESTIONS 1-9: 17
SUM OF ALL RESPONSES TO PHQ QUESTIONS 1-9: 17

## 2023-02-28 ASSESSMENT — ENCOUNTER SYMPTOMS
NERVOUS/ANXIOUS: 1
CARDIOVASCULAR NEGATIVE: 1
RESPIRATORY NEGATIVE: 1
SLEEP DISTURBANCE: 1
NEUROLOGICAL NEGATIVE: 1

## 2023-02-28 NOTE — PROGRESS NOTES
"  Assessment & Plan     Moderate recurrent major depression (H)  Get back on sertraline if he has any further thoughts of self-harm or develops a plan he needs to seek emergent care we should see him in 6 months prior as needed  - sertraline (ZOLOFT) 100 MG tablet  Dispense: 90 tablet; Refill: 1               BMI:   Estimated body mass index is 36.53 kg/m  as calculated from the following:    Height as of this encounter: 1.848 m (6' 0.75\").    Weight as of this encounter: 124.7 kg (275 lb).       Depression Screening Follow Up    PHQ 2/28/2023   PHQ-9 Total Score 17   Q9: Thoughts of better off dead/self-harm past 2 weeks Several days   F/U: Thoughts of suicide or self-harm Yes   F/U: Self harm-plan No   F/U: Self-harm action No   F/U: Safety concerns No                 Follow Up    Follow Up Actions Taken      Discussed the following ways the patient can remain in a safe environment:        No follow-ups on file.    ELIJAH Clark  [facility]    Benito Mallory is a 38 year old, presenting for the following health issues:  Anxiety and Depression      38-year-old male presents to the clinic to follow-up for his mood disorder  His anxiety is somewhat worse states he is having difficulty with he has upstairs rental neighbor  He states he has some random thoughts of self-harm but no plan and states he could never do anything because of his family and his pets  He works at the [employer]  The neighbor bothers him mainly when he tries to sleep  He ran out of sertraline about 2 weeks ago and has noticed an increase in his symptoms since being off of that.  He states when he was on it he was doing very well.    Anxiety    History of Present Illness       Mental Health Follow-up:  Patient presents to follow-up on Depression & Anxiety.Patient's depression since last visit has been:  Worse  The patient is having other symptoms associated with depression.  Patient's anxiety " "since last visit has been:  Bad  The patient is having other symptoms associated with anxiety.  Any significant life events: housing concerns  Patient is feeling anxious or having panic attacks.  Patient has no concerns about alcohol or drug use.    He eats 0-1 servings of fruits and vegetables daily.He consumes 1 sweetened beverage(s) daily.He exercises with enough effort to increase his heart rate 10 to 19 minutes per day.  He exercises with enough effort to increase his heart rate 3 or less days per week. He is missing 1 dose(s) of medications per week.    Today's PHQ-9         PHQ-9 Total Score: 17    PHQ-9 Q9 Thoughts of better off dead/self-harm past 2 weeks :   Several days  Thoughts of suicide or self harm: (P) Yes  Self-harm Plan:   (P) No  Self-harm Action:     (P) No  Safety concerns for self or others: (P) No    How difficult have these problems made it for you to do your work, take care of things at home, or get along with other people: Extremely difficult  Today's ROBI-7 Score: 17             Review of Systems   Respiratory: Negative.    Cardiovascular: Negative.    Neurological: Negative.    Psychiatric/Behavioral: Positive for mood changes and sleep disturbance. Negative for self-injury and suicidal ideas. The patient is nervous/anxious.             Objective    BP (!) 136/92   Pulse 62   Temp 96.9  F (36.1  C)   Resp 16   Ht 1.848 m (6' 0.75\")   Wt 124.7 kg (275 lb)   SpO2 95%   BMI 36.53 kg/m    Body mass index is 36.53 kg/m .  Physical Exam  Psychiatric:         Mood and Affect: Mood normal.         Behavior: Behavior normal.         Thought Content: Thought content normal.         Judgment: Judgment normal.      alert attentive no acute distress  Dressed and groomed appropriately  Cardiovascular regular rate                        "

## 2023-08-16 DIAGNOSIS — F33.1 MODERATE RECURRENT MAJOR DEPRESSION (H): ICD-10-CM

## 2023-08-16 RX ORDER — SERTRALINE HYDROCHLORIDE 100 MG/1
100 TABLET, FILM COATED ORAL DAILY
Qty: 30 TABLET | Refills: 0 | Status: SHIPPED | OUTPATIENT
Start: 2023-08-16 | End: 2023-09-15

## 2023-08-16 NOTE — TELEPHONE ENCOUNTER
Needs virtual visit  
Routing refill request to provider for review/approval because:  Needs PHQ     MIRACLE Badillo  [facility]  
Routing refill request to provider for review/approval because:  Needs PHQ, also needs PCP updated     MIRACLE Badillo  [facility]  
Depressed

## 2023-09-15 DIAGNOSIS — F33.1 MODERATE RECURRENT MAJOR DEPRESSION (H): ICD-10-CM

## 2023-09-15 RX ORDER — SERTRALINE HYDROCHLORIDE 100 MG/1
100 TABLET, FILM COATED ORAL DAILY
Qty: 30 TABLET | Refills: 0 | Status: SHIPPED | OUTPATIENT
Start: 2023-09-15 | End: 2023-10-17

## 2023-10-17 DIAGNOSIS — F33.1 MODERATE RECURRENT MAJOR DEPRESSION (H): ICD-10-CM

## 2023-10-17 RX ORDER — SERTRALINE HYDROCHLORIDE 100 MG/1
100 TABLET, FILM COATED ORAL DAILY
Qty: 30 TABLET | Refills: 0 | Status: SHIPPED | OUTPATIENT
Start: 2023-10-17 | End: 2023-11-21

## 2023-11-21 DIAGNOSIS — F33.1 MODERATE RECURRENT MAJOR DEPRESSION (H): ICD-10-CM

## 2023-11-21 RX ORDER — SERTRALINE HYDROCHLORIDE 100 MG/1
100 TABLET, FILM COATED ORAL DAILY
Qty: 30 TABLET | Refills: 0 | Status: SHIPPED | OUTPATIENT
Start: 2023-11-21 | End: 2023-12-27

## 2023-12-27 DIAGNOSIS — F33.1 MODERATE RECURRENT MAJOR DEPRESSION (H): ICD-10-CM

## 2023-12-27 RX ORDER — SERTRALINE HYDROCHLORIDE 100 MG/1
100 TABLET, FILM COATED ORAL DAILY
Qty: 30 TABLET | Refills: 0 | Status: SHIPPED | OUTPATIENT
Start: 2023-12-27 | End: 2024-01-30

## 2024-01-30 DIAGNOSIS — F33.1 MODERATE RECURRENT MAJOR DEPRESSION (H): ICD-10-CM

## 2024-01-30 RX ORDER — SERTRALINE HYDROCHLORIDE 100 MG/1
100 TABLET, FILM COATED ORAL DAILY
Qty: 30 TABLET | Refills: 0 | Status: SHIPPED | OUTPATIENT
Start: 2024-01-30 | End: 2024-03-05

## 2024-03-05 DIAGNOSIS — F33.1 MODERATE RECURRENT MAJOR DEPRESSION (H): ICD-10-CM

## 2024-03-05 RX ORDER — SERTRALINE HYDROCHLORIDE 100 MG/1
100 TABLET, FILM COATED ORAL DAILY
Qty: 30 TABLET | Refills: 0 | Status: SHIPPED | OUTPATIENT
Start: 2024-03-05 | End: 2024-04-10

## 2024-04-09 DIAGNOSIS — F33.1 MODERATE RECURRENT MAJOR DEPRESSION (H): ICD-10-CM

## 2024-04-10 RX ORDER — SERTRALINE HYDROCHLORIDE 100 MG/1
100 TABLET, FILM COATED ORAL DAILY
Qty: 30 TABLET | Refills: 0 | Status: SHIPPED | OUTPATIENT
Start: 2024-04-10 | End: 2024-05-14

## 2024-05-14 DIAGNOSIS — F33.1 MODERATE RECURRENT MAJOR DEPRESSION (H): ICD-10-CM

## 2024-05-14 RX ORDER — SERTRALINE HYDROCHLORIDE 100 MG/1
100 TABLET, FILM COATED ORAL DAILY
Qty: 30 TABLET | Refills: 0 | Status: SHIPPED | OUTPATIENT
Start: 2024-05-14 | End: 2024-06-18

## 2024-05-15 NOTE — TELEPHONE ENCOUNTER
Left voicemail for Mohamud, when he calls back please assist him with scheduling a visit with David Maravilla.

## 2024-05-17 NOTE — TELEPHONE ENCOUNTER
I called and left voicemail message for pt to schedule medication check appt with [facility] as soon as he is able.  [phone]  Bright Gustafson CMA

## 2024-06-18 DIAGNOSIS — F33.1 MODERATE RECURRENT MAJOR DEPRESSION (H): ICD-10-CM

## 2024-06-18 RX ORDER — SERTRALINE HYDROCHLORIDE 100 MG/1
100 TABLET, FILM COATED ORAL DAILY
Qty: 30 TABLET | Refills: 0 | Status: SHIPPED | OUTPATIENT
Start: 2024-06-18 | End: 2024-07-24

## 2024-07-24 DIAGNOSIS — F33.1 MODERATE RECURRENT MAJOR DEPRESSION (H): ICD-10-CM

## 2024-07-24 RX ORDER — SERTRALINE HYDROCHLORIDE 100 MG/1
100 TABLET, FILM COATED ORAL DAILY
Qty: 30 TABLET | Refills: 0 | Status: SHIPPED | OUTPATIENT
Start: 2024-07-24 | End: 2024-08-27

## 2024-08-27 DIAGNOSIS — F33.1 MODERATE RECURRENT MAJOR DEPRESSION (H): ICD-10-CM

## 2024-08-27 RX ORDER — SERTRALINE HYDROCHLORIDE 100 MG/1
100 TABLET, FILM COATED ORAL DAILY
Qty: 30 TABLET | Refills: 0 | Status: SHIPPED | OUTPATIENT
Start: 2024-08-27 | End: 2024-10-01

## 2024-10-01 DIAGNOSIS — F33.1 MODERATE RECURRENT MAJOR DEPRESSION (H): ICD-10-CM

## 2024-10-01 RX ORDER — SERTRALINE HYDROCHLORIDE 100 MG/1
100 TABLET, FILM COATED ORAL DAILY
Qty: 30 TABLET | Refills: 0 | Status: SHIPPED | OUTPATIENT
Start: 2024-10-01

## 2024-11-05 DIAGNOSIS — F33.1 MODERATE RECURRENT MAJOR DEPRESSION (H): ICD-10-CM

## 2024-11-05 NOTE — TELEPHONE ENCOUNTER
Pt called in- mentioned that he was advised to let care team know that he is scheduled for his preventative adult 11/11. He just ran out of sertraline (ZOLOFT) 100 MG tablet and is requesting refill prior to appt if possible.     Preferred Pharmacy:  [facility] - [place], [address]  P: [phone]  F: [phone]    Okay to leave a detailed message?: Yes at Cell number on file:    Telephone Information:   Mobile [phone]

## 2024-11-06 RX ORDER — SERTRALINE HYDROCHLORIDE 100 MG/1
100 TABLET, FILM COATED ORAL DAILY
Qty: 30 TABLET | Refills: 0 | Status: SHIPPED | OUTPATIENT
Start: 2024-11-06 | End: 2024-11-11

## 2024-11-11 ENCOUNTER — OFFICE VISIT (OUTPATIENT)
Dept: FAMILY MEDICINE | Facility: CLINIC | Age: 39
End: 2024-11-11
Payer: MEDICARE

## 2024-11-11 VITALS
DIASTOLIC BLOOD PRESSURE: 76 MMHG | SYSTOLIC BLOOD PRESSURE: 130 MMHG | OXYGEN SATURATION: 97 % | HEART RATE: 60 BPM | WEIGHT: 272.8 LBS | RESPIRATION RATE: 18 BRPM | BODY MASS INDEX: 36.15 KG/M2 | TEMPERATURE: 97.2 F | HEIGHT: 73 IN

## 2024-11-11 DIAGNOSIS — Z00.00 MEDICARE ANNUAL WELLNESS VISIT, SUBSEQUENT: ICD-10-CM

## 2024-11-11 DIAGNOSIS — R73.02 IGT (IMPAIRED GLUCOSE TOLERANCE): ICD-10-CM

## 2024-11-11 DIAGNOSIS — Z13.6 SCREENING FOR CARDIOVASCULAR CONDITION: ICD-10-CM

## 2024-11-11 DIAGNOSIS — F33.1 MODERATE RECURRENT MAJOR DEPRESSION (H): Primary | ICD-10-CM

## 2024-11-11 DIAGNOSIS — Z23 NEED FOR PROPHYLACTIC VACCINATION AGAINST HEPATITIS B VIRUS: ICD-10-CM

## 2024-11-11 PROCEDURE — 99213 OFFICE O/P EST LOW 20 MIN: CPT | Mod: 25 | Performed by: PHYSICIAN ASSISTANT

## 2024-11-11 PROCEDURE — 36415 COLL VENOUS BLD VENIPUNCTURE: CPT | Performed by: PHYSICIAN ASSISTANT

## 2024-11-11 PROCEDURE — 80048 BASIC METABOLIC PNL TOTAL CA: CPT | Performed by: PHYSICIAN ASSISTANT

## 2024-11-11 PROCEDURE — G0439 PPPS, SUBSEQ VISIT: HCPCS | Performed by: PHYSICIAN ASSISTANT

## 2024-11-11 PROCEDURE — 91320 SARSCV2 VAC 30MCG TRS-SUC IM: CPT | Performed by: PHYSICIAN ASSISTANT

## 2024-11-11 PROCEDURE — 90480 ADMN SARSCOV2 VAC 1/ONLY CMP: CPT | Performed by: PHYSICIAN ASSISTANT

## 2024-11-11 PROCEDURE — 80061 LIPID PANEL: CPT | Performed by: PHYSICIAN ASSISTANT

## 2024-11-11 RX ORDER — SERTRALINE HYDROCHLORIDE 100 MG/1
100 TABLET, FILM COATED ORAL DAILY
Qty: 90 TABLET | Refills: 3 | Status: SHIPPED | OUTPATIENT
Start: 2024-11-11

## 2024-11-11 SDOH — HEALTH STABILITY: PHYSICAL HEALTH: ON AVERAGE, HOW MANY MINUTES DO YOU ENGAGE IN EXERCISE AT THIS LEVEL?: 20 MIN

## 2024-11-11 SDOH — HEALTH STABILITY: PHYSICAL HEALTH: ON AVERAGE, HOW MANY DAYS PER WEEK DO YOU ENGAGE IN MODERATE TO STRENUOUS EXERCISE (LIKE A BRISK WALK)?: 3 DAYS

## 2024-11-11 ASSESSMENT — SOCIAL DETERMINANTS OF HEALTH (SDOH): HOW OFTEN DO YOU GET TOGETHER WITH FRIENDS OR RELATIVES?: ONCE A WEEK

## 2024-11-11 ASSESSMENT — PATIENT HEALTH QUESTIONNAIRE - PHQ9
SUM OF ALL RESPONSES TO PHQ QUESTIONS 1-9: 13
10. IF YOU CHECKED OFF ANY PROBLEMS, HOW DIFFICULT HAVE THESE PROBLEMS MADE IT FOR YOU TO DO YOUR WORK, TAKE CARE OF THINGS AT HOME, OR GET ALONG WITH OTHER PEOPLE: VERY DIFFICULT
SUM OF ALL RESPONSES TO PHQ QUESTIONS 1-9: 13

## 2024-11-11 NOTE — PROGRESS NOTES
"Preventive Care Visit  [facility]  ELIJAH Clark, Family Medicine  Nov 11, 2024      Assessment & Plan     (F33.1) Moderate recurrent major depression (H)  (primary encounter diagnosis)  Comment: Stable  Plan: sertraline (ZOLOFT) 100 MG tablet        Renewed his current prescription    (Z23) Need for prophylactic vaccination against hepatitis B virus  Comment: Get at pharmacy  Plan:     (Z00.00) Medicare annual wellness visit, subsequent  Comment: Chronic conditions stable  Plan: Return in 1 year and as needed    (R73.02) IGT (impaired glucose tolerance)  Comment: History of  Plan: Basic metabolic panel        Labs pending    (Z13.6) Screening for cardiovascular condition  Comment: For screening  Plan: Lipid panel reflex to direct LDL Fasting        Labs pending            BMI  Estimated body mass index is 36.24 kg/m  as calculated from the following:    Height as of this encounter: 1.848 m (6' 0.75\").    Weight as of this encounter: 123.7 kg (272 lb 12.8 oz).       Depression Screening Follow Up        11/11/2024     3:50 PM   PHQ   PHQ-9 Total Score 13    Q9: Thoughts of better off dead/self-harm past 2 weeks Several days    F/U: Thoughts of suicide or self-harm Yes    F/U: Self harm-plan No    F/U: Self-harm action No    F/U: Safety concerns No        Patient-reported                     Follow Up Actions Taken  Crisis resource information provided in the After Visit Summary  Patient declined referral.    Discussed the following ways the patient can remain in a safe environment:  be around others  Counseling  Appropriate preventive services were addressed with this patient via screening, questionnaire, or discussion as appropriate for fall prevention, nutrition, physical activity, Tobacco-use cessation, social engagement, weight loss and cognition.  Checklist reviewing preventive services available has been given to the patient.  Reviewed patient's diet, addressing concerns and/or " questions.   He is at risk for lack of exercise and has been provided with information to increase physical activity for the benefit of his well-being.   The patient was instructed to see the dentist every 6 months.   Discussed possible causes of fatigue. Updated plan of care.  Patient reported difficulty with activities of daily living were addressed today.The patient's PHQ-9 score is consistent with moderate depression. He was provided with information regarding depression.           Benito Mallory is a 39 year old, presenting for the following:  Medicare Visit (AWV)        11/11/2024     4:02 PM   Additional Questions   Roomed by KIARRA Bernal           Patient here for annual wellness visit  He needs refill of sertraline  Working well  He sleeps intermittently he states he is falls asleep well but then sometimes will wake not always because he has to void  He works at the [employer] and enjoys that  Below would like to get his COVID-vaccine  He has history of IGT and we should recheck his chemistries  We should get a lipid panel as it has been since 2018  No other complaints or concerns              Health Care Directive  Patient does not have a Health Care Directive:       11/11/2024   General Health   How would you rate your overall physical health? (!) FAIR   Feel stress (tense, anxious, or unable to sleep) To some extent      (!) STRESS CONCERN      11/11/2024   Nutrition   Diet: Regular (no restrictions)            11/11/2024   Exercise   Days per week of moderate/strenous exercise 3 days   Average minutes spent exercising at this level 20 min            11/11/2024   Social Factors   Frequency of gathering with friends or relatives Once a week   Worry food won't last until get money to buy more Yes   Food not last or not have enough money for food? Yes   Do you have housing? (Housing is defined as stable permanent housing and does not include staying ouside in a car, in a tent, in an abandoned  building, in an overnight shelter, or couch-surfing.) Yes   Are you worried about losing your housing? Yes   Lack of transportation? No   Unable to get utilities (heat,electricity)? No   Want help with housing or utility concern? No      (!) FOOD SECURITY CONCERN PRESENT(!) HOUSING CONCERN PRESENT      11/11/2024   Fall Risk   Fallen 2 or more times in the past year? No    Trouble with walking or balance? No        Patient-reported          11/11/2024   Activities of Daily Living- Home Safety   Needs help with the following daily activites Telephone use    Money management   Safety concerns in the home None of the above       Multiple values from one day are sorted in reverse-chronological order         11/11/2024   Dental   Dentist two times every year? (!) NO            11/11/2024   Hearing Screening   Hearing concerns? None of the above            11/11/2024   Driving Risk Screening   Patient/family members have concerns about driving No            11/11/2024   General Alertness/Fatigue Screening   Have you been more tired than usual lately? (!) YES            11/11/2024   Urinary Incontinence Screening   Bothered by leaking urine in past 6 months No            11/11/2024   TB Screening   Were you born outside of the [place]? No          Today's PHQ-9 Score:       11/11/2024     3:50 PM   PHQ-9 SCORE   PHQ-9 Total Score MyChart 13 (Moderate depression)   PHQ-9 Total Score 13        Patient-reported         11/11/2024   Substance Use   Alcohol more than 3/day or more than 7/wk Not Applicable   Do you have a current opioid prescription? No   How severe/bad is pain from 1 to 10? 2/10   Do you use any other substances recreationally? (!) CANNABIS PRODUCTS        Social History     Tobacco Use    Smoking status: Never     Passive exposure: Never    Smokeless tobacco: Never   Vaping Use    Vaping status: Never Used             11/11/2024   One time HIV Screening   Previous HIV test? No            11/11/2024  "  Contraception/Family Planning   Questions about contraception or family planning No            Reviewed and updated as needed this visit by Provider                      Current providers sharing in care for this patient include:  Patient Care Team:  No Ref-Primary, Physician as PCP - David Sharp PA as Assigned PCP    The following health maintenance items are reviewed in Epic and correct as of today:  Health Maintenance   Topic Date Due    ANNUAL REVIEW OF HM ORDERS  Never done    ADVANCE CARE PLANNING  Never done    DEPRESSION ACTION PLAN  Never done    HIV SCREENING  Never done    HEPATITIS C SCREENING  Never done    HEPATITIS B IMMUNIZATION (1 of 3 - 19+ 3-dose series) Never done    MEDICARE ANNUAL WELLNESS VISIT  07/16/2020    GLUCOSE  07/16/2022    INFLUENZA VACCINE (1) 09/01/2024    COVID-19 Vaccine (5 - 2024-25 season) 09/01/2024    PHQ-9  05/11/2025    DTAP/TDAP/TD IMMUNIZATION (9 - Td or Tdap) 09/17/2025    RSV VACCINE (1 - 1-dose 75+ series) 01/16/2061    Pneumococcal Vaccine: Pediatrics (0 to 5 Years) and At-Risk Patients (6 to 64 Years)  Aged Out    HPV IMMUNIZATION  Aged Out    MENINGITIS IMMUNIZATION  Aged Out    RSV MONOCLONAL ANTIBODY  Aged Out            Objective    Exam  /76 (BP Location: Right arm, Patient Position: Sitting, Cuff Size: Adult Large)   Pulse 60   Temp 97.2  F (36.2  C) (Tympanic)   Resp 18   Ht 1.848 m (6' 0.75\")   Wt 123.7 kg (272 lb 12.8 oz)   SpO2 97%   BMI 36.24 kg/m     Estimated body mass index is 36.24 kg/m  as calculated from the following:    Height as of this encounter: 1.848 m (6' 0.75\").    Weight as of this encounter: 123.7 kg (272 lb 12.8 oz).    Physical Exam  Vitals and nursing note reviewed.   Constitutional:       Appearance: Normal appearance. He is obese.   HENT:      Head: Normocephalic and atraumatic.      Right Ear: Tympanic membrane normal.      Left Ear: Tympanic membrane normal.      Nose: Nose normal. No congestion or " rhinorrhea.      Mouth/Throat:      Mouth: Mucous membranes are moist.   Eyes:      Conjunctiva/sclera: Conjunctivae normal.   Cardiovascular:      Rate and Rhythm: Normal rate and regular rhythm.      Heart sounds: Normal heart sounds.   Pulmonary:      Effort: Pulmonary effort is normal.      Breath sounds: Normal breath sounds.   Abdominal:      General: Abdomen is flat. Bowel sounds are normal.      Palpations: There is no mass.      Tenderness: There is no abdominal tenderness.   Musculoskeletal:         General: Normal range of motion.      Cervical back: Normal range of motion and neck supple.      Right lower leg: No edema.      Left lower leg: No edema.   Lymphadenopathy:      Cervical: No cervical adenopathy.   Skin:     General: Skin is warm and dry.   Neurological:      General: No focal deficit present.   Psychiatric:         Mood and Affect: Mood normal.         Behavior: Behavior normal.         Thought Content: Thought content normal.         Judgment: Judgment normal.               11/11/2024   Mini Cog   Mini-Cog Not Completed (choose reason) Patient declines          Patient declines, there are NO concerns for cognitive deficits.           Signed Electronically by: ELIJAH Clark    Answers submitted by the patient for this visit:  Patient Health Questionnaire (Submitted on 11/11/2024)  If you checked off any problems, how difficult have these problems made it for you to do your work, take care of things at home, or get along with other people?: Very difficult  PHQ9 TOTAL SCORE: 13

## 2024-11-11 NOTE — LETTER
November 12, 2024      Mohamud Izaguirre II  [address]   [facility] [place] [facility] [address]        Dear ,    We are writing to inform you of your test results.    Your results are acceptable.    Resulted Orders   Basic metabolic panel   Result Value Ref Range    Sodium 140 135 - 145 mmol/L    Potassium 4.2 3.4 - 5.3 mmol/L    Chloride 107 98 - 107 mmol/L    Carbon Dioxide (CO2) 20 (L) 22 - 29 mmol/L    Anion Gap 13 7 - 15 mmol/L    Urea Nitrogen 11.8 6.0 - 20.0 mg/dL    Creatinine 0.95 0.67 - 1.17 mg/dL    GFR Estimate >90 >60 mL/min/1.73m2      Comment:      eGFR calculated using 2021 CKD-EPI equation.    Calcium 9.8 8.8 - 10.4 mg/dL      Comment:      Reference intervals for this test were updated on 7/16/2024 to reflect our healthy population more accurately. There may be differences in the flagging of prior results with similar values performed with this method. Those prior results can be interpreted in the context of the updated reference intervals.    Glucose 97 70 - 99 mg/dL    Patient Fasting > 8hrs? No    Lipid panel reflex to direct LDL Fasting   Result Value Ref Range    Cholesterol 195 <200 mg/dL    Triglycerides 196 (H) <150 mg/dL    Direct Measure HDL 42 >=40 mg/dL    LDL Cholesterol Calculated 114 (H) <100 mg/dL    Non HDL Cholesterol 153 (H) <130 mg/dL    Patient Fasting > 8hrs? No     Narrative    Cholesterol  Desirable: < 200 mg/dL  Borderline High: 200 - 239 mg/dL  High: >= 240 mg/dL    Triglycerides  Normal: < 150 mg/dL  Borderline High: 150 - 199 mg/dL  High: 200-499 mg/dL  Very High: >= 500 mg/dL    Direct Measure HDL  Female: >= 50 mg/dL   Male: >= 40 mg/dL    LDL Cholesterol  Desirable: < 100 mg/dL  Above Desirable: 100 - 129 mg/dL   Borderline High: 130 - 159 mg/dL   High:  160 - 189 mg/dL   Very High: >= 190 mg/dL    Non HDL Cholesterol  Desirable: < 130 mg/dL  Above Desirable: 130 - 159 mg/dL  Borderline High: 160 - 189 mg/dL  High: 190 - 219 mg/dL  Very High: >= 220 mg/dL       If  you have any questions or concerns, please call the clinic at the number listed above.       Sincerely,      ELIJAH Beth

## 2024-11-12 LAB
ANION GAP SERPL CALCULATED.3IONS-SCNC: 13 MMOL/L (ref 7–15)
BUN SERPL-MCNC: 11.8 MG/DL (ref 6–20)
CALCIUM SERPL-MCNC: 9.8 MG/DL (ref 8.8–10.4)
CHLORIDE SERPL-SCNC: 107 MMOL/L (ref 98–107)
CHOLEST SERPL-MCNC: 195 MG/DL
CREAT SERPL-MCNC: 0.95 MG/DL (ref 0.67–1.17)
EGFRCR SERPLBLD CKD-EPI 2021: >90 ML/MIN/1.73M2
FASTING STATUS PATIENT QL REPORTED: NO
FASTING STATUS PATIENT QL REPORTED: NO
GLUCOSE SERPL-MCNC: 97 MG/DL (ref 70–99)
HCO3 SERPL-SCNC: 20 MMOL/L (ref 22–29)
HDLC SERPL-MCNC: 42 MG/DL
LDLC SERPL CALC-MCNC: 114 MG/DL
NONHDLC SERPL-MCNC: 153 MG/DL
POTASSIUM SERPL-SCNC: 4.2 MMOL/L (ref 3.4–5.3)
SODIUM SERPL-SCNC: 140 MMOL/L (ref 135–145)
TRIGL SERPL-MCNC: 196 MG/DL